# Patient Record
Sex: FEMALE | Race: WHITE | NOT HISPANIC OR LATINO | Employment: FULL TIME | URBAN - METROPOLITAN AREA
[De-identification: names, ages, dates, MRNs, and addresses within clinical notes are randomized per-mention and may not be internally consistent; named-entity substitution may affect disease eponyms.]

---

## 2017-11-07 ENCOUNTER — TRANSFERRED RECORDS (OUTPATIENT)
Dept: HEALTH INFORMATION MANAGEMENT | Facility: CLINIC | Age: 27
End: 2017-11-07

## 2018-10-03 ENCOUNTER — HEALTH MAINTENANCE LETTER (OUTPATIENT)
Age: 28
End: 2018-10-03

## 2018-10-08 ENCOUNTER — OFFICE VISIT (OUTPATIENT)
Dept: FAMILY MEDICINE | Facility: CLINIC | Age: 28
End: 2018-10-08
Payer: COMMERCIAL

## 2018-10-08 VITALS
WEIGHT: 170.4 LBS | RESPIRATION RATE: 20 BRPM | HEART RATE: 74 BPM | DIASTOLIC BLOOD PRESSURE: 74 MMHG | TEMPERATURE: 98.1 F | OXYGEN SATURATION: 100 % | HEIGHT: 66 IN | BODY MASS INDEX: 27.38 KG/M2 | SYSTOLIC BLOOD PRESSURE: 130 MMHG

## 2018-10-08 DIAGNOSIS — Z00.00 ROUTINE GENERAL MEDICAL EXAMINATION AT A HEALTH CARE FACILITY: Primary | ICD-10-CM

## 2018-10-08 DIAGNOSIS — F40.232 PHOBIA OF DENTAL PROCEDURE: ICD-10-CM

## 2018-10-08 RX ORDER — ASCORBIC ACID 500 MG
500 TABLET ORAL DAILY
COMMUNITY

## 2018-10-08 ASSESSMENT — ANXIETY QUESTIONNAIRES
1. FEELING NERVOUS, ANXIOUS, OR ON EDGE: NOT AT ALL
3. WORRYING TOO MUCH ABOUT DIFFERENT THINGS: SEVERAL DAYS
GAD7 TOTAL SCORE: 1
2. NOT BEING ABLE TO STOP OR CONTROL WORRYING: NOT AT ALL
5. BEING SO RESTLESS THAT IT IS HARD TO SIT STILL: NOT AT ALL
6. BECOMING EASILY ANNOYED OR IRRITABLE: NOT AT ALL
7. FEELING AFRAID AS IF SOMETHING AWFUL MIGHT HAPPEN: NOT AT ALL
IF YOU CHECKED OFF ANY PROBLEMS ON THIS QUESTIONNAIRE, HOW DIFFICULT HAVE THESE PROBLEMS MADE IT FOR YOU TO DO YOUR WORK, TAKE CARE OF THINGS AT HOME, OR GET ALONG WITH OTHER PEOPLE: NOT DIFFICULT AT ALL

## 2018-10-08 ASSESSMENT — PATIENT HEALTH QUESTIONNAIRE - PHQ9: 5. POOR APPETITE OR OVEREATING: NOT AT ALL

## 2018-10-08 ASSESSMENT — PAIN SCALES - GENERAL: PAINLEVEL: NO PAIN (0)

## 2018-10-08 NOTE — PROGRESS NOTES
"    Female Physical Note    Pt is a 28 y/o with no significant past medical hx presenting for a new pt visit. Pt moved to the area from [place] about a year ago and is here to start follow up with a clinic, she has no concerns or health issues. Pt has a hx of \"doctor phobia\" with no trauma associated with onset, and states its much worse with dentists than it is with doctors. Pt used to see a sedation specialist dentist who would give her valium prior to dental procedures and is wondering if this could be prescribed by her pcp.     Pt denies and recent illness, fever, weight changes.     PMHx: none  Surgical: none  Family: none  Allergies: NKA    ROS:  CONSTITUTIONAL: no unexpected change in weight  SKIN: no worrisome lesions  EYES: no acute vision problems or changes  ENT: no ear problems, no mouth problems, no throat problems  RESP: no significant cough, no shortness of breath  CV: no chest pain, SOB, exercise intolerance   GI: no nausea, no vomiting, no constipation, no diarrhea    Sexually Active: Yes  Sexual concerns: No   Contraception:partner has vasectomy   P: 0010  Menarche: Patient's last menstrual period was 2018.   Irregular periods, pt gets period about every 2.5 months.     Last Pap Smear Date: , normal      There is no problem list on file for this patient.      Current Outpatient Prescriptions   Medication Sig Dispense Refill     Multiple Vitamins-Minerals (WOMENS MULTIVITAMIN PO) Take 2 tablets by mouth daily       ascorbic acid (VITAMIN C) 500 MG tablet Take 500 mg by mouth daily       cholecalciferol (VITAMIN D3) 1000 UNIT tablet Take 1,000 Units by mouth daily         History reviewed. No pertinent past medical history.     Family History     Problem (# of Occurrences) Relation (Name,Age of Onset)    No Known Problems (18) Mother, Father, Maternal Grandmother, Maternal Grandfather, Paternal Grandmother, Paternal Grandfather, Brother, Sister, Son, Daughter, Maternal Half-Brother, " "Maternal Half-Sister, Paternal Half-Brother, Paternal Half-Sister, Niece, Nephew, Cousin, Other       Negative family history of: Diabetes, Coronary Artery Disease, Hypertension, Hyperlipidemia, Cerebrovascular Disease, Breast Cancer, Colon Cancer, Prostate Cancer, Other Cancer, Depression, Anxiety Disorder, Mental Illness, Substance Abuse, Anesthesia Reaction, Asthma, Osteoporosis, Genetic Disorder, Thyroid Disease, Obesity, Unknown/Adopted          Problem List Medication List and Allergy List were reviewed.    Patient is a new patient to this clinic and so  I reviewed/updated the Past Medical History, the Family History and the Social History. .    Social History   Substance Use Topics     Smoking status: Never Smoker     Smokeless tobacco: Never Used     Alcohol use No     Cohabiting   Children ? no    Has anyone hurt you physically, for example by pushing, hitting, slapping or kicking you or forcing you to have sex? Denies  Do you feel threatened or controlled by a partner, ex-partner or anyone in your life? Denies    RISK BEHAVIORS AND HEALTHY HABITS:  Tobacco Use/Smoking: None  Illicit Drug Use: None  Do you use alcohol? Yes - occasional   Diet (5-7 servings of fruits/veg daily): \"I could eat vegetables all day\"    Exercise (30 min accumulated most days): Walks with the dog everyday. 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   Dental Care: Yes   Calcium 1500 mg/d:  No    Pap every 3 years for women 21-29. Recommended and patient accepted testing.    There is no immunization history on file for this patient. Reviewed Immunization Record Today and records are not available. Pt believes she is UTD    EXAMINATION:   /74  Pulse 74  Temp 98.1  F (36.7  C) (Oral)  Resp 20  Ht 5' 5.87\" (167.3 cm)  Wt 170 lb 6.4 oz (77.3 kg)  LMP 09/30/2018  SpO2 100%  Breastfeeding? No  BMI 27.62 kg/m2  GENERAL: healthy, alert and no distress  EYES: Eyes grossly normal to inspection, extraocular movements - " "intact, and PERRL  HENT: ear canals- normal; TMs- normal; Nose- normal; Mouth- no ulcers, no lesions  NECK: no tenderness, no adenopathy, no asymmetry, no masses, no stiffness; thyroid- normal to palpation  RESP: lungs clear to auscultation - no rales, no rhonchi, no wheezes  BREAST: declined  CV: regular rates and rhythm, normal S1 S2, no S3 or S4 and no murmur, no click or rub -  ABDOMEN: soft, no tenderness, no  hepatosplenomegaly, no masses, normal bowel sounds  MS: extremities- no gross deformities noted, no edema  SKIN: no suspicious lesions, no rashes  NEURO: strength and tone- normal, sensory exam- grossly normal, mentation- intact, speech- normal, reflexes- symmetric  - female: cervix- normal, adnexae- normal; uterus- normal, no masses, no discharge  PSYCH: Alert and oriented times 3; speech- coherent , normal rate and volume; able to articulate logical thoughts, able to abstract reason, no tangential thoughts, no hallucinations or delusions, affect- normal  LYMPHATICS: ant. cervical- normal, post. cervical- normal, axillary- normal, supraclavicular- normal, inguinal- normal    ASSESSMENT & PLAN:  1. Routine general medical examination at a health care facility  Benign exam. Due for pap. Reviewed healthcare maintenance and wellness.   - [facility] Cytology ([facility])    2. Phobia of dental procedure  Patient has history of phobia of the dentist. Has seen a \"sedated dentist\" in the past that prescribed valium and used nitrous during her cleanings. She currently has [place] for medical coverage and had difficulty finding a sedated dentist who will take this insurance. Discussed that I would be comfortable providing her with a few doses of valium for dental procedures only. She will call her previous dentist to find out the dose that was effective in the past. She will call the clinic back with this information.      I was present with the medical student who participated in the service and in the documentation of " this note. I have verified the history and personally performed the physical exam and medical decision making, and have verified the content of the note, which accurately reflects my assessment of the patient and the plan of care.   Rosalba Andrews MD     I precepted today with Giorgio Jarvis MD.

## 2018-10-08 NOTE — MR AVS SNAPSHOT
After Visit Summary   10/8/2018    Pauly Figueredo    MRN: [id number]           Patient Information     Date Of Birth          1990        Visit Information        Provider Department      10/8/2018 1:10 PM Rosalba Andrews MD [facility]        Today's Diagnoses     Routine general medical examination at a health care facility    -  1    Phobia of dental procedure          Care Instructions    To do list:  1) PAP smear today  2) Find out your valium dose from your old dentist. Give the clinic a call about this and I will write a prescription    Preventive Health Recommendations  Female Ages 26 - 39  Yearly exam:   See your health care provider every year in order to    Review health changes.     Discuss preventive care.      Review your medicines if you your doctor has prescribed any.    Until age 30: Get a Pap test every three years (more often if you have had an abnormal result).    After age 30: Talk to your doctor about whether you should have a Pap test every 3 years or have a Pap test with HPV screening every 5 years.   You do not need a Pap test if your uterus was removed (hysterectomy) and you have not had cancer.  You should be tested each year for STDs (sexually transmitted diseases), if you're at risk.   Talk to your provider about how often to have your cholesterol checked.  If you are at risk for diabetes, you should have a diabetes test (fasting glucose).  Shots: Get a flu shot each year. Get a tetanus shot every 10 years.   Nutrition:     Eat at least 5 servings of fruits and vegetables each day.    Eat whole-grain bread, whole-wheat pasta and brown rice instead of white grains and rice.    Get adequate Calcium and Vitamin D.     Lifestyle    Exercise at least 150 minutes a week (30 minutes a day, 5 days of the week). This will help you control your weight and prevent disease.    Limit alcohol to one drink per day.    No smoking.     Wear sunscreen to prevent skin  "cancer.    See your dentist every six months for an exam and cleaning.            Follow-ups after your visit        Who to contact     Please call your clinic at [phone] to:    Ask questions about your health    Make or cancel appointments    Discuss your medicines    Learn about your test results    Speak to your doctor            Additional Information About Your Visit        MyChart Information     [employer] is an electronic gateway that provides easy, online access to your medical records. With [employer], you can request a clinic appointment, read your test results, renew a prescription or communicate with your care team.     To sign up for [facility] visit the website at [email or web address]   You will be asked to enter the access code listed below, as well as some personal information. Please follow the directions to create your username and password.     Your access code is: [email or web address]  Expires: 2019  2:32 PM     Your access code will  in 90 days. If you need help or a new code, please contact your [facility] or call [phone] for assistance.        Care EveryWhere ID     This is your Care EveryWhere ID. This could be used by other organizations to access your [facility] medical records  [id number]        Your Vitals Were     Pulse Temperature Respirations Height Last Period Pulse Oximetry    74 98.1  F (36.7  C) (Oral) 20 5' 5.87\" (167.3 cm) 2018 100%    Breastfeeding? BMI (Body Mass Index)                No 27.62 kg/m2           Blood Pressure from Last 3 Encounters:   10/08/18 130/74    Weight from Last 3 Encounters:   10/08/18 170 lb 6.4 oz (77.3 kg)              We Performed the Following     GYN Cytology ([facility])        Primary Care Provider Office Phone # Fax #    Rosalba Andrews [phone]       [facility]  [address] [place] [address]        Equal Access to Services     THIAGO GRIFFITH AH: Verónica dewitt " vinny Senior, akira hausermarvaha, romain kamadisyn joséanoop, roque basiain hayaamacie kumarlenora juniemeekkasey duvallAguilaryasmine lyndon. So [facility] [phone].    ATENCIÓN: Si habla español, tiene a dowling disposición servicios gratuitos de asistencia lingüística. Kira al [phone].    We comply with applicable federal civil rights laws and [place] laws. We do not discriminate on the basis of race, color, national origin, age, disability, sex, sexual orientation, or gender identity.            Thank you!     Thank you for choosing [facility]  for your care. Our goal is always to provide you with excellent care. Hearing back from our patients is one way we can continue to improve our services. Please take a few minutes to complete the written survey that you may receive in the mail after your visit with us. Thank you!             Your Updated Medication List - Protect others around you: Learn how to safely use, store and throw away your medicines at www.disposemymeds.org.          This list is accurate as of 10/8/18  2:32 PM.  Always use your most recent med list.                   Brand Name Dispense Instructions for use Diagnosis    ascorbic acid 500 MG tablet    VITAMIN C     Take 500 mg by mouth daily        cholecalciferol 1000 UNIT tablet    vitamin D3     Take 1,000 Units by mouth daily        WOMENS MULTIVITAMIN PO      Take 2 tablets by mouth daily

## 2018-10-08 NOTE — NURSING NOTE
Chief Complaint   Patient presents with     Consult     Complete Physical Exam      Dexter Abarca, JEM

## 2018-10-08 NOTE — PATIENT INSTRUCTIONS
To do list:  1) PAP smear today  2) Find out your valium dose from your old dentist. Give the clinic a call about this and I will write a prescription    Preventive Health Recommendations  Female Ages 26 - 39  Yearly exam:   See your health care provider every year in order to    Review health changes.     Discuss preventive care.      Review your medicines if you your doctor has prescribed any.    Until age 30: Get a Pap test every three years (more often if you have had an abnormal result).    After age 30: Talk to your doctor about whether you should have a Pap test every 3 years or have a Pap test with HPV screening every 5 years.   You do not need a Pap test if your uterus was removed (hysterectomy) and you have not had cancer.  You should be tested each year for STDs (sexually transmitted diseases), if you're at risk.   Talk to your provider about how often to have your cholesterol checked.  If you are at risk for diabetes, you should have a diabetes test (fasting glucose).  Shots: Get a flu shot each year. Get a tetanus shot every 10 years.   Nutrition:     Eat at least 5 servings of fruits and vegetables each day.    Eat whole-grain bread, whole-wheat pasta and brown rice instead of white grains and rice.    Get adequate Calcium and Vitamin D.     Lifestyle    Exercise at least 150 minutes a week (30 minutes a day, 5 days of the week). This will help you control your weight and prevent disease.    Limit alcohol to one drink per day.    No smoking.     Wear sunscreen to prevent skin cancer.    See your dentist every six months for an exam and cleaning.

## 2018-10-08 NOTE — LETTER
October 16, 2018      Pauly Figueredo  [address]  [place] [address]        Dear Pauly,    Please see below for your test results.  I hope you're well. I wanted to communicate with you the results of the tests that we did. Wanted to let you know that your pap results were normal. We do not need to repeat the test until 3 years from now.     Please let me know if you have any other questions or concerns.     Resulted Orders   GYN Cytology ([facility])   Result Value Ref Range    Lab AP Case Report SEE RESULTS BELOW       Comment:      Gynecologic Cytology Report                       Case: [id number]                                     Authorizing Provider:  Kirill Jarvis MD      Collected:             10/08/2018 1452              First Screen:          BOB Tran   Received:              10/09/2018 0958                                     (ASCP)                                                                         Specimen:    SUREPATH PAP, SCREENING, Endocervical/cervical                                               Lab AP Gyn Interpretation SEE RESULTS BELOW       Comment:      Negative for squamous intraepithelial lesion or malignancy  Electronically signed by BOB rTan (ASCP) on 10/10/2018 at   12:14 PM      Lab AP Malignant? Normal Normal    Specimen adequacy:       Satisfactory for evaluation, endocervical/transformation zone component present    HPV Reflex? Yes if ASCUS     High Risk? No     Last Mens Period 9/30/2018     Lab AP Abnormal Bleeding No     Lab AP Patient Status n/a     Lab AP Birth Control/Hormones None     Lab AP Previous Normal 2014     Lab AP Previous Abnl none     Lab AP Cervical Appearance normal     Narrative    Test performed by:  [facility] [address] [place] [address]       If you have any questions, please call the clinic to make an appointment.    Sincerely,    Rosalba Andrews MD

## 2018-10-09 ENCOUNTER — RECORDS - HEALTHEAST (OUTPATIENT)
Dept: ADMINISTRATIVE | Facility: OTHER | Age: 28
End: 2018-10-09

## 2018-10-09 ASSESSMENT — ANXIETY QUESTIONNAIRES: GAD7 TOTAL SCORE: 1

## 2018-10-09 ASSESSMENT — PATIENT HEALTH QUESTIONNAIRE - PHQ9: SUM OF ALL RESPONSES TO PHQ QUESTIONS 1-9: 0

## 2018-10-10 ENCOUNTER — RECORDS - HEALTHEAST (OUTPATIENT)
Dept: ADMINISTRATIVE | Facility: OTHER | Age: 28
End: 2018-10-10

## 2018-10-10 LAB
CYTOLOGY CVX/VAG DOC THIN PREP: NORMAL
HIGH RISK?: NO
HPV REFLEX?: NORMAL
LAB AP ABNORMAL BLEEDING: NO
LAB AP BIRTH CONTROL/HORMONES: NORMAL
LAB AP CASE REPORT: NORMAL
LAB AP CERVICAL APPEARANCE: NORMAL
LAB AP MALIGNANT?: NORMAL
LAB AP PATIENT STATUS: NORMAL
LAB AP PREVIOUS ABNL: NORMAL
LAB AP PREVIOUS NORMAL: 2014
LAST MENS PERIOD: NORMAL
SPECIMEN ADEQUACY:: NORMAL

## 2018-10-14 NOTE — PROGRESS NOTES
Preceptor Attestation:   Patient seen, evaluated and discussed with the resident. I have verified the content of the note, which accurately reflects my assessment of the patient and the plan of care.   Supervising Physician:  Kirill Jarvis MD

## 2018-10-15 NOTE — PROGRESS NOTES
Alberto Villafana,    Please call the following patient with the results below. Thanks    I hope you're well. I wanted to communicate with you the results of the tests that we did. Wanted to let you know that your pap results were normal. We do not need to repeat the test until 3 years from now.     Please let me know if you have any other questions or concerns.     Pratima Kam, PGY2  (Proxy for Dr. Andrews)

## 2018-11-06 ENCOUNTER — TELEPHONE (OUTPATIENT)
Dept: FAMILY MEDICINE | Facility: CLINIC | Age: 28
End: 2018-11-06

## 2018-11-06 DIAGNOSIS — F40.232 PHOBIA OF DENTAL PROCEDURE: Primary | ICD-10-CM

## 2018-11-06 NOTE — TELEPHONE ENCOUNTER
[facility] phone call message- general phone call:    Reason for call: She seen  a couple of weeks ago for a physical and she was told to find out how much Diazepam her dentis was giving her so she can refill the prescription. She is getting 2, 10mg an hour before the apt then if the apt lasted more then two hours they would give her another 10mg.she has an appointment on this Monday the 12th    Return call needed: Yes    OK to leave a message on voice mail? Yes    Primary language: English      needed? No    Call taken on November 6, 2018 at 3:53 PM by Carolin Yadav

## 2018-11-07 RX ORDER — DIAZEPAM 10 MG
TABLET ORAL
Qty: 3 TABLET | Refills: 0 | Status: SHIPPED | OUTPATIENT
Start: 2018-11-07 | End: 2019-12-26

## 2018-11-07 NOTE — TELEPHONE ENCOUNTER
Please contact patient and let her know that paper prescription for valium will be left at the  of the clinic. This cannot be e-scribed as it is a controlled substance.     Thanks!

## 2018-11-08 NOTE — TELEPHONE ENCOUNTER
"Patient came into clinic asking for the paper prescription for valium.  RN gave paper prescription to patient after verifying patient's ID.  Patient was also requesting a letter that she could take to her dentist that explained why she is needing to take the valium prior to the procedure and what she is taking.  Did instruct patient to call the dental office and read the prescription note to them as it listed \"phobia of\" to see if that would be sufficient.  Instructed that she could always call us back if she was needing anything further.    Routed to Dr. Andrews/JASON Edward RN    "

## 2018-11-09 ENCOUNTER — TELEPHONE (OUTPATIENT)
Dept: FAMILY MEDICINE | Facility: CLINIC | Age: 28
End: 2018-11-09

## 2018-11-09 NOTE — TELEPHONE ENCOUNTER
Regarding valium, per Dr. Escobar, how long the valium will have an effect will vary. Pt can make their best judgement, so if they feel dizzy, tired, drowsy, do not operate a vehicle. If they feel they can be fully alert while operating a vehicle, then it should be okay.   Discussed the above w/ pt. Pt verbalizes understanding of the directions and information. Gave pt opportunity to ask additional questions or address concerns.     Routed to Dr. Juliana blackburn. /RAHUL Ma

## 2018-11-09 NOTE — TELEPHONE ENCOUNTER
[facility] phone call message- general phone call:    Reason for call: She was prescribed some medication that say's not to operate a vehicle,She wants to know how long after she takes the medication should she not drive?she needs a call back about this asap. She usually has plans after work and needs to know if she needs to cancel them.    Return call needed: Yes    OK to leave a message on voice mail? Yes    Primary language: English      needed? No    Call taken on November 9, 2018 at 3:34 PM by Carolin Yadav

## 2019-10-11 ENCOUNTER — OFFICE VISIT (OUTPATIENT)
Dept: FAMILY MEDICINE | Facility: CLINIC | Age: 29
End: 2019-10-11
Payer: COMMERCIAL

## 2019-10-11 VITALS
SYSTOLIC BLOOD PRESSURE: 128 MMHG | DIASTOLIC BLOOD PRESSURE: 82 MMHG | WEIGHT: 175 LBS | TEMPERATURE: 98.4 F | OXYGEN SATURATION: 100 % | HEIGHT: 66 IN | RESPIRATION RATE: 18 BRPM | BODY MASS INDEX: 28.12 KG/M2 | HEART RATE: 76 BPM

## 2019-10-11 DIAGNOSIS — M54.2 NECK PAIN: ICD-10-CM

## 2019-10-11 DIAGNOSIS — L91.8 SKIN TAG: ICD-10-CM

## 2019-10-11 DIAGNOSIS — Z23 NEED FOR VACCINATION: ICD-10-CM

## 2019-10-11 DIAGNOSIS — F40.232 FEAR OF OTHER MEDICAL CARE: ICD-10-CM

## 2019-10-11 DIAGNOSIS — Z00.00 ROUTINE GENERAL MEDICAL EXAMINATION AT A HEALTH CARE FACILITY: Primary | ICD-10-CM

## 2019-10-11 ASSESSMENT — MIFFLIN-ST. JEOR: SCORE: 1532.6

## 2019-10-11 NOTE — PATIENT INSTRUCTIONS
Patient Education     Chin [place]oleman (Posture and Strength)    1. Sit in a chair with your feet flat on the floor, or stand up. Relax your shoulders.  2. Look straight ahead. Gently glide your chin straight back. It s a small movement. Don t tilt your head up or down, or bend your neck forward.  3. Hold for 5 seconds. Then relax.  4. Repeat 5 times, or as instructed.     Tip: Don t arch your back or hunch your shoulders.   Patient Education     Neck Pain    There are several possible causes of neck pain when there is no injury:    You can get a minor ligament sprain or muscle strain from a sudden minor neck movement. Sleeping with your neck in an awkward position can also cause this.    Some people respond to emotional stress by tensing the muscles of their neck, shoulders, and upper back. Chronic spasm in these muscles can cause neck pain and sometimes headaches.    Gradual wear and tear of the joints in the spine can cause degenerative arthritis. This can be a source of occasional or chronic neck pain.    The spinal disks may bulge and put pressure on a nearby spinal nerve. This can happen as a natural result of aging or repeated small injuries to the neck. The spinal disks are the cushions between each spinal bone. This causes tingling, pain, or numbness that spreads from the neck to the shoulder, arm, or hand on one side.  Acute neck pain usually gets better in 1 to 2 weeks. Neck pain related to disk disease, arthritis in the spinal joints, or spinal stenosis can become chronic and last for months or years. Spinal stenosis is narrowing of the spinal canal.  X-rays are usually not ordered for the initial evaluation of neck pain. However, X-rays may be done if you had a forceful physical injury, such as a car accident or fall. If pain continues and doesn t respond to medical treatment, X-rays and other tests may be done at a later time.  Home care    Rest and relax the muscles. Use a comfortable pillow that supports  the head. It should also help keep the spine in a neutral position. The position of the head should not be tilted forward or backward. A rolled up towel may help for a custom fit.    Some people find relief with heat. Heat can be applied with either a warm shower or bath or a moist towel heated in the microwave and massage. Others prefer cold packs. You can make an ice pack by filling a plastic bag that seals at the top with ice cubes or crushed ice and then wrapping it with a thin towel. Try both and use the method that feels best for 15 to 20 minutes, several times a day.    Whether using ice or heat, be careful that you do not injure your skin. Never put ice directly on the skin. Always wrap the ice in a towel or other type of cloth.This is very important, especially in people with poor skin sensations.     Try to reduce your stress level. Emotional stress can lead to neck muscle tension and get in the way of or delay the healing process.    You may use over-the-counter pain medicine to control pain, unless another medicine was prescribed. If you have chronic liver or kidney disease or ever had a stomach ulcer or GI bleeding, talk with your healthcare provider before using these medicines.  Follow-up care  Follow up with your healthcare provider if your symptoms do not show signs of improvement after one week. Physical therapy or further tests may be needed.  If X-rays, CT scans, or MRI scans were taken, you will be told of any new findings that may affect your care.  Call 911  Call 911 if you have:    Sudden weakness or numbness in one or both arms    Neck swelling, difficulty or painful swallowing    Difficulty breathing    Chest pain  When to seek medical advice  Call your healthcare provider right away if any of these occur:    Pain becomes worse or spreads into one or both arm    Increasing headache    Fever of 100.4 F (38 C) or higher, or as directed by your healthcare provider  Date Last Reviewed:  7/1/2016 2000-2018 [employer]. [address] [place] [address]. All rights reserved. This information is not intended as a substitute for professional medical care. Always follow your healthcare professional's instructions.             Date Last Reviewed: 5/1/2016 2000-2018 [employer]. [address] [place] [address]. All rights reserved. This information is not intended as a substitute for professional medical care. Always follow your healthcare professional's instructions.         Patient Education     Head Tilt / Upper Trapezius Stretch (Flexibility)    1. Sit up straight in a chair with your head and neck in a neutral position, ears in line with shoulders. Hold the edge of your chair seat with your right hand. Tuck your chin in slightly.  2. Tilt your head to the left, while looking straight ahead.  3. Put your left hand on the right side of your head. Gently pull your head to the left. Hold for 30 to 60 seconds. Use gentle pressure to increase the stretch. Don t force your head into position.  4. Return your head and neck to the neutral position.  5. Repeat this exercise 2 times, or as instructed.  6. Switch sides and repeat 2 times, or as instructed.  Challenge yourself  Tuck one end of a towel under your left arm. Then bring the other end over your right shoulder. Pull the towel down on your right shoulder with both hands as you side-bend your head to the left. Repeat with the other side.   Date Last Reviewed: 3/10/2016    4172-5780 [employer]. [address] [place] [address]. All rights reserved. This information is not intended as a substitute for professional medical care. Always follow your healthcare professional's instructions.           Preventive Health Recommendations  Female Ages 26 - 39  Yearly exam:   See your health care provider every year in order to    Review health changes.     Discuss preventive care.       Review your medicines if you your doctor has prescribed any.    Until age 30: Get a Pap test every three years (more often if you have had an abnormal result).    After age 30: Talk to your doctor about whether you should have a Pap test every 3 years or have a Pap test with HPV screening every 5 years.   You do not need a Pap test if your uterus was removed (hysterectomy) and you have not had cancer.  You should be tested each year for STDs (sexually transmitted diseases), if you're at risk.   Talk to your provider about how often to have your cholesterol checked.  If you are at risk for diabetes, you should have a diabetes test (fasting glucose).  Shots: Get a flu shot each year. Get a tetanus shot every 10 years.   Nutrition:     Eat at least 5 servings of fruits and vegetables each day.    Eat whole-grain bread, whole-wheat pasta and brown rice instead of white grains and rice.    Get adequate Calcium and Vitamin D.     Lifestyle    Exercise at least 150 minutes a week (30 minutes a day, 5 days of the week). This will help you control your weight and prevent disease.    Limit alcohol to one drink per day.    No smoking.     Wear sunscreen to prevent skin cancer.    See your dentist every six months for an exam and cleaning.

## 2019-10-11 NOTE — PROGRESS NOTES
Preceptor Attestation:   Patient seen, evaluated and discussed with the resident. I have verified the content of the note, which accurately reflects my assessment of the patient and the plan of care.   Supervising Physician:  Carter Guadalupe MD

## 2019-10-11 NOTE — PROGRESS NOTES
Female Physical Note    Concerns today:   -Fear of dentist: She had been getting sedation through her dentist prior to moving to [place].  Her current insurance does not cover sedation through the dentist.  She does go to with sedation dentistry office, but she has been receiving the Valium through our clinic.  Her dose was verified through her previous dentist office.  Her next dental appointment is in 2020.  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  She states that it is not really bothering her but is noticeable.  She tries to work on her posture and do stretches.  -Skin tags: She has some skin tags and nevi that are catching on clothing.    ROS:  CONSTITUTIONAL: no fatigue, no unexpected change in weight  SKIN: no worrisome rashes, no worrisome moles, no worrisome lesions  EYES: no acute vision problems or changes  ENT: no ear problems, no mouth problems, no throat problems  RESP: no significant cough, no shortness of breath  CV: no chest pain, no palpitations, no new or worsening peripheral edema  GI: no nausea, no vomiting, no constipation, no diarrhea    Sexually Active: Yes  Sexual concerns: No   Contraception:not needed and Details: partner had vasectomy   P: 0  Menarche: Patient's last menstrual period was 2019. q45 day +/- 3 days, was worked up in [place] in the past  STD History: Neg  Last Pap Smear Date: 10/8/2018 normal  Abnormal Pap History: None    Patient Active Problem List   Diagnosis     Fear of other medical care     Current Outpatient Medications   Medication Sig Dispense Refill     ascorbic acid (VITAMIN C) 500 MG tablet Take 500 mg by mouth daily       cholecalciferol (VITAMIN D3) 1000 UNIT tablet Take 1,000 Units by mouth daily       diazepam (VALIUM) 10 MG tablet Take 20 mg 30-60 minutes before procedure. If the procedure lasts longer than 2 hours, take an  additional 10 mg. Do not operate a vehicle after taking this medication. (Patient not taking: Reported on 10/11/2019) 3 tablet 0     Multiple Vitamins-Minerals (WOMENS MULTIVITAMIN PO) Take 2 tablets by mouth daily       Past Medical History:   Diagnosis Date     Vitamin D deficiency       Family History     Problem (# of Occurrences) Relation (Name,Age of Onset)    Anxiety Disorder (1) Brother    No Known Problems (17) Mother, Father, Maternal Grandmother, Maternal Grandfather, Paternal Grandmother, Paternal Grandfather, Sister, Son, Daughter, Maternal Half-Brother, Maternal Half-Sister, Paternal Half-Brother, Paternal Half-Sister, Niece, Nephew, Cousin, Other       Negative family history of: Diabetes, Coronary Artery Disease, Hypertension, Hyperlipidemia, Cerebrovascular Disease, Breast Cancer, Colon Cancer, Prostate Cancer, Other Cancer, Depression, Mental Illness, Substance Abuse, Anesthesia Reaction, Asthma, Osteoporosis, Genetic Disorder, Thyroid Disease, Obesity, Unknown/Adopted        Problem List Medication List and Allergy List were reviewed, updated and discussed briefly.    Patient is an established patient of this clinic..    Social History     Tobacco Use     Smoking status: Never Smoker     Smokeless tobacco: Never Used   Substance Use Topics     Alcohol use: No     Cohabiting   Children ? no    Has anyone hurt you physically, for example by pushing, hitting, slapping or kicking you or forcing you to have sex? Denies  Do you feel threatened or controlled by a partner, ex-partner or anyone in your life? Denies    RISK BEHAVIORS AND HEALTHY HABITS:  Tobacco Use/Smoking: None  Illicit Drug Use: None  Do you use alcohol? Very occasional, 2 drinks/month  Diet (5-7 servings of fruits/veg daily): Yes   Exercise (30 min accumulated most days):Yes  Dental Care: Yes   Calcium 1500 mg/d:  Yes  Seat Belt Use: Yes     Pap every 3 years for women 21-29. Date done 10/8/2018  Result(s) normal  HIV screening:   "Recommended and patient declined testing.  Lipid panel for 11/7/2017 was negative    There is no immunization history on file for this patient. Reviewed Immunization Record Today  Tdap recommended and accepted  Influenza declined  Reports that she completed HPV series    EXAMINATION:   /82 (BP Location: Left arm, Patient Position: Sitting, Cuff Size: Adult Large)   Pulse 76   Temp 98.4  F (36.9  C) (Oral)   Resp 18   Ht 1.664 m (5' 5.5\")   Wt 79.4 kg (175 lb)   LMP 09/21/2019   SpO2 100%   BMI 28.68 kg/m    GENERAL: healthy, alert and no distress  EYES: Eyes grossly normal to inspection, extraocular movements - intact, and PERRL  HENT: ear canals- normal; TMs- normal; Nose- normal; Mouth- no ulcers, no lesions  NECK: no adenopathy, no asymmetry, no masses, no stiffness; thyroid- normal to palpation, neck extension does result in some pain in her right upper trapezius, no midline tenderness  RESP: lungs clear to auscultation - no rales, no rhonchi, no wheezes  BREAST: Declined by patient  CV: regular rates and rhythm, normal S1 S2, no S3 or S4 and no murmur, no click or rub  ABDOMEN: soft, no tenderness, no  hepatosplenomegaly, no masses, normal bowel sounds  MS: extremities- no gross deformities noted, no edema  SKIN: no suspicious lesions, no rashes.  She does have a skin tag on her mid back along her bra line, skin tag on right flank along pant line, and to nevi on her left hip that are less than 1 cm in diameter with smooth borders and uniform color  NEURO: strength and tone- normal, sensory exam- grossly normal, mentation- intact, speech- normal, reflexes- symmetric  BACK: no CVA tenderness, no paralumbar tenderness  -declined by patient  PSYCH: Alert and oriented times 3; speech- coherent , normal rate and volume; able to articulate logical thoughts, able to abstract reason, no tangential thoughts, no hallucinations or delusions, affect- normal  LYMPHATICS: ant. cervical- normal, post. cervical- " normal, supraclavicular- normal    ASSESSMENT/PLAN:  Pauly was seen today for physical.    Diagnoses and all orders for this visit:    Routine general medical examination at a health care facility    Fear of other medical care  Discussed calling within 30 days of her next dental appointment on 1/6/2020.  Will fill her value at that time.  She takes 20 mg before her dental cleaning and has another 10 mg available if the visit lasts for longer than 2 hours.  Her fiancé drives her to and from her dental appointment.    Need for vaccination  -     TDAP VACCINE (BOOSTRIX)  Declined influenza vaccination today.    Skin tag, nevi  No suspicious lesions on exam.  Did note the areas that concerned her so that they can be monitored over time.  Discussed that sometimes skin tags catch on clothing, but removal is not typically covered by insurance.  She is agreeable to continuing to monitor.    Neck pain  Neck extension did elicit some pain in the right upper trapezius.  Provided handouts for back exercises.  She is not interested in PT at this time.  We will continue to monitor.    Routine follow up in one year.    Huong Brandt MD PGY2

## 2019-12-16 ENCOUNTER — TELEPHONE (OUTPATIENT)
Dept: FAMILY MEDICINE | Facility: CLINIC | Age: 29
End: 2019-12-16

## 2019-12-16 NOTE — TELEPHONE ENCOUNTER
[facility] phone call message- general phone call:    Reason for call: She has a dentist apt on 1/6 and she was told by  to call in later this month so she can get some diazepam prescribed.    Action desired: call back.    Return call needed: Yes    OK to leave a message on voice mail? Yes    Advised patient to response may take up to 2 business days: Yes    Primary language: English      needed? No    Call taken on December 16, 2019 at 11:58 AM by Carolin Yadav

## 2019-12-17 DIAGNOSIS — F40.232 PHOBIA OF DENTAL PROCEDURE: ICD-10-CM

## 2019-12-26 RX ORDER — DIAZEPAM 10 MG
TABLET ORAL
Qty: 3 TABLET | Refills: 0 | Status: SHIPPED | OUTPATIENT
Start: 2019-12-26 | End: 2019-12-26

## 2019-12-26 RX ORDER — DIAZEPAM 10 MG
TABLET ORAL
Qty: 3 TABLET | Refills: 0 | Status: SHIPPED | OUTPATIENT
Start: 2019-12-26 | End: 2020-08-27

## 2019-12-27 NOTE — TELEPHONE ENCOUNTER
Called patient informed her of the medication, she said she received a text message from her pharmacy so she will go pick it up today.

## 2020-08-18 ENCOUNTER — TELEPHONE (OUTPATIENT)
Dept: FAMILY MEDICINE | Facility: CLINIC | Age: 30
End: 2020-08-18

## 2020-08-18 NOTE — TELEPHONE ENCOUNTER
[facility] phone call message- patient requesting a refill:    Full Medication Name: VALIUM      Dose: 10MG    Pharmacy confirmed as   [facility]/pharmacy #[id number] - [place], [place] - [facility] [address]  Phone: [phone] Fax: [phone]  : Yes    Additional Comments: She has a dentist apt coming up can she get this filled.     OK to leave a message on voice mail? Yes    Primary language: English      needed? No    Call taken on August 18, 2020 at 12:15 PM by Carolin Yadav

## 2020-08-19 DIAGNOSIS — F40.232 PHOBIA OF DENTAL PROCEDURE: ICD-10-CM

## 2020-08-21 NOTE — TELEPHONE ENCOUNTER
Patient uses 20-30 mg diazepam prior to dental appointments, and her significant other drives her for these visits. Reviewed .  No concerns. Last fill was in 12/2019. Next dentist appointment is 9/4/2020. As I do not have capability to eprescribe, will ask faculty to send prescription.    Huong Brandt MD PGY3

## 2020-08-24 NOTE — TELEPHONE ENCOUNTER
Pt's calling to check on status. She needs it for dentist appointment 09/04/2020.    Pharmacy temporarily using     [facility]  [address] in [place]  [phone]

## 2020-08-27 RX ORDER — DIAZEPAM 10 MG
TABLET ORAL
Qty: 3 TABLET | Refills: 0 | Status: SHIPPED | OUTPATIENT
Start: 2020-08-27 | End: 2021-03-10

## 2020-08-27 RX ORDER — DIAZEPAM 10 MG
TABLET ORAL
Qty: 3 TABLET | Refills: 0 | Status: SHIPPED | OUTPATIENT
Start: 2020-08-27 | End: 2020-08-27

## 2021-03-10 DIAGNOSIS — F40.232 PHOBIA OF DENTAL PROCEDURE: ICD-10-CM

## 2021-03-10 RX ORDER — DIAZEPAM 10 MG
TABLET ORAL
Qty: 3 TABLET | Refills: 0 | Status: SHIPPED | OUTPATIENT
Start: 2021-03-10

## 2021-03-17 ENCOUNTER — OFFICE VISIT (OUTPATIENT)
Dept: FAMILY MEDICINE | Facility: CLINIC | Age: 31
End: 2021-03-17
Payer: COMMERCIAL

## 2021-03-17 VITALS
DIASTOLIC BLOOD PRESSURE: 82 MMHG | WEIGHT: 162 LBS | OXYGEN SATURATION: 97 % | TEMPERATURE: 98.7 F | HEART RATE: 114 BPM | BODY MASS INDEX: 26.55 KG/M2 | SYSTOLIC BLOOD PRESSURE: 142 MMHG | RESPIRATION RATE: 18 BRPM

## 2021-03-17 DIAGNOSIS — Z12.4 SCREENING FOR CERVICAL CANCER: ICD-10-CM

## 2021-03-17 DIAGNOSIS — Z00.00 ROUTINE GENERAL MEDICAL EXAMINATION AT A HEALTH CARE FACILITY: Primary | ICD-10-CM

## 2021-03-17 PROCEDURE — 99395 PREV VISIT EST AGE 18-39: CPT | Mod: GC | Performed by: STUDENT IN AN ORGANIZED HEALTH CARE EDUCATION/TRAINING PROGRAM

## 2021-03-17 ASSESSMENT — ANXIETY QUESTIONNAIRES
3. WORRYING TOO MUCH ABOUT DIFFERENT THINGS: SEVERAL DAYS
5. BEING SO RESTLESS THAT IT IS HARD TO SIT STILL: NOT AT ALL
1. FEELING NERVOUS, ANXIOUS, OR ON EDGE: MORE THAN HALF THE DAYS
7. FEELING AFRAID AS IF SOMETHING AWFUL MIGHT HAPPEN: NOT AT ALL
GAD7 TOTAL SCORE: 6
6. BECOMING EASILY ANNOYED OR IRRITABLE: NOT AT ALL
2. NOT BEING ABLE TO STOP OR CONTROL WORRYING: MORE THAN HALF THE DAYS
4. TROUBLE RELAXING: SEVERAL DAYS

## 2021-03-17 NOTE — PROGRESS NOTES
Female Physical Note    Concerns today:   Anxiety: Has ongoing history of generalized anxiety as well as significant anxiety related to medical visits. Notes she has arranged to start seeing a therapist. No interest in starting anxiety meds.  Neck/Back Pain: Has history of pinched nerves in her neck. Worked out about two weeks ago and noticed some lower neck/upper back pain after some lat pulls. Has radiation down the left arm. This has improved with OTC meds and hot pads.    ROS:  CONSTITUTIONAL: no fatigue,   SKIN: no worrisome rashes, no worrisome moles  EYES: no acute vision problems or changes  ENT: no ear problems, no mouth problems, no throat problems  RESP: no significant cough, no shortness of breath  CV: no chest pain, no palpitations,   GI: no nausea, no vomiting, no constipation, no diarrhea    Sexually Active: Yes  Sexual concerns: No   Contraception:not needed and Details: partner had vasectomy   P: 0  Menarche: No LMP recorded. (Menstrual status: Irregular Periods). Previously worked up in [place]. Gets them about every month and a half.   STD History: Neg  Last Pap Smear Date: 10/8/2018 normal   Abnormal Pap History: None    Patient Active Problem List   Diagnosis     Fear of other medical care     Current Outpatient Medications   Medication Sig Dispense Refill     ascorbic acid (VITAMIN C) 500 MG tablet Take 500 mg by mouth daily       cholecalciferol (VITAMIN D3) 1000 UNIT tablet Take 1,000 Units by mouth daily       diazepam (VALIUM) 10 MG tablet Take 20 mg 30-60 minutes before procedure. If the procedure islonger than 2 hours, take an additional 10 mg. Do not operate a vehicle after. 3 tablet 0     Multiple Vitamins-Minerals (WOMENS MULTIVITAMIN PO) Take 2 tablets by mouth daily       Past Medical History:   Diagnosis Date     Fear of other medical care 10/11/2019     Vitamin D deficiency       Family History     Problem (# of Occurrences) Relation (Name,Age of Onset)    Anxiety Disorder  (1) Brother    No Known Problems (17) Mother, Father, Maternal Grandmother, Maternal Grandfather, Paternal Grandmother, Paternal Grandfather, Sister, Son, Daughter, Maternal Half-Brother, Maternal Half-Sister, Paternal Half-Brother, Paternal Half-Sister, Niece, Nephew, Cousin, Other       Negative family history of: Diabetes, Coronary Artery Disease, Hypertension, Hyperlipidemia, Cerebrovascular Disease, Breast Cancer, Colon Cancer, Prostate Cancer, Other Cancer, Depression, Mental Illness, Substance Abuse, Anesthesia Reaction, Asthma, Osteoporosis, Genetic Disorder, Thyroid Disease, Obesity, Unknown/Adopted        Problem List Medication List and Allergy List were reviewed.    Patient is an established patient of this clinic..    Social History     Tobacco Use     Smoking status: Never Smoker     Smokeless tobacco: Never Used   Substance Use Topics     Alcohol use: No     Cohabiting   Children ? no    Has anyone hurt you physically, for example by pushing, hitting, slapping or kicking you or forcing you to have sex? Denies  Do you feel threatened or controlled by a partner, ex-partner or anyone in your life? Denies    RISK BEHAVIORS AND HEALTHY HABITS:  Tobacco Use/Smoking: None  Illicit Drug Use: None  Do you use alcohol? Yes, 2 drinks/month  Diet (5-7 servings of fruits/veg daily): Yes   Exercise (30 min accumulated most days):Yes  Dental Care: Yes   Calcium 1500 mg/d:  Yes  Seat Belt Use: Yes     Breast CA Screening (>39 yo or 10 y before 1st degree relative diagnosis): Low risk     Immunization History   Administered Date(s) Administered     TDAP Vaccine (Boostrix) 10/11/2019    Reviewed Immunization Record Today    EXAMINATION:   BP (!) 142/82 (BP Location: Left arm, Patient Position: Sitting, Cuff Size: Adult Regular)   Pulse 114   Temp 98.7  F (37.1  C) (Oral)   Resp 18   Wt 73.5 kg (162 lb)   SpO2 97%   BMI 26.55 kg/m    GENERAL: healthy, alert, slightly anxious  EYES: Eyes grossly normal to  inspection, extraocular movements - intact, and PERRL  HENT: ear canals- normal; TMs- normal; Nose- normal; Mouth- no ulcers, no lesions  NECK: no tenderness, no adenopathy, no asymmetry, no masses, no stiffness; thyroid- normal to palpation  RESP: lungs clear to auscultation - no rales, no rhonchi, no wheezes  BREAST: no masses, no tenderness, no nipple discharge, no palpable axillary masses or adenopathy  CV: regular rates and rhythm, normal S1 S2, no S3 or S4 and no murmur, no click or rub -  ABDOMEN: soft, no tenderness, no  hepatosplenomegaly, no masses, normal bowel sounds  MS: extremities- no gross deformities noted, no edema  SKIN: no suspicious lesions, no rashes  NEURO: strength and tone- normal, sensory exam- grossly normal, mentation- intact, speech- normal, reflexes- symmetric  BACK: no CVA tenderness, no paralumbar tenderness  - female: cervix- normal, adnexae- normal; uterus- normal, no masses, no discharge  PSYCH: Alert and oriented times 3; speech- coherent , normal rate and volume; able to articulate logical thoughts, able to abstract reason, no tangential thoughts, no hallucinations or delusions, affect- anxious  LYMPHATICS: ant. cervical- normal, post. cervical- normal, axillary- normal, supraclavicular- normal, inguinal- normal    ASSESSMENT:  1. Health Care Maintenance: Normal Physical Exam  2. Elevated blood pressure noted today- patient has history of high blood pressure related to anxiety with medical visits. Endorsed feeling anxious today, and this is likely the cause of her high blood pressure.   3. Discussed need for HIV and Hep C screening in adulthood- patient declined today and would prefer to complete this screening at her next CPE in 1 year.     PLAN:  1. Pap smear done, cytology pending  2. Return for routine screening in one year.      SYDNEE Langley    Resident/Fellow Attestation   I, Huong Brandt, was present with the medical/JAMIL student who participated in the service  and in the documentation of the note.  I have verified the history and personally performed the physical exam and medical decision making.  I agree with the assessment and plan of care as documented in the note.      Huong Brandt MD  PGY3

## 2021-03-17 NOTE — PROGRESS NOTES
Preceptor Attestation:    I discussed the patient with the resident and evaluated the patient in person. I have verified the content of the note, which accurately reflects my assessment of the patient and the plan of care.   Supervising Physician:  Cipriano Dumont MD.

## 2021-03-18 LAB
FINAL DIAGNOSIS: NORMAL
HPV HR 12 DNA CVX QL NAA+PROBE: NEGATIVE
HPV16 DNA SPEC QL NAA+PROBE: NEGATIVE
HPV18 DNA SPEC QL NAA+PROBE: NEGATIVE
SPECIMEN DESCRIPTION: NORMAL
SPECIMEN SOURCE CVX/VAG CYTO: NORMAL

## 2021-03-24 ENCOUNTER — RECORDS - HEALTHEAST (OUTPATIENT)
Dept: ADMINISTRATIVE | Facility: OTHER | Age: 31
End: 2021-03-24

## 2021-03-24 LAB
CYTOLOGY CVX/VAG DOC THIN PREP: NORMAL
HIGH RISK?: NO
HPV REFLEX?: NORMAL
LAB AP ABNORMAL BLEEDING: NO
LAB AP BIRTH CONTROL/HORMONES: NORMAL
LAB AP CERVICAL APPEARANCE: NORMAL
LAB AP PATIENT STATUS: NORMAL
LAB AP PREVIOUS ABNL: NORMAL
LAB AP PREVIOUS NORMAL: NORMAL
LAST MENS PERIOD: NORMAL
PATH REPORT.COMMENTS IMP SPEC: NORMAL
PATH REPORT.COMMENTS IMP SPEC: NORMAL
SPECIMEN ADEQUACY:: NORMAL